# Patient Record
(demographics unavailable — no encounter records)

---

## 2024-10-08 NOTE — HISTORY OF PRESENT ILLNESS
[Former] : Former [TextBox_13] : Mr. HINES is a 68 year old male.   He was called to review eligibility for Low-Dose CT lung cancer screening.  Reviewed and confirmed that the patient meets screening eligibility criteria:   68 years old   Smoking Status: Former smoker    Number of pack(s) per day: 1 Number of years smoked: 47 Number of pack years smokin Quit year:    No symptoms of lung cancer, including new cough, change in cough, hemoptysis, and unintentional weight loss.   No personal history of lung cancer.  History of lung cancer in a first degree relative, his mother.  No history of lung disease or occupational exposures. [YearQuit] : 2019 [PacksperYear] : 47